# Patient Record
Sex: FEMALE | Race: WHITE | NOT HISPANIC OR LATINO
[De-identification: names, ages, dates, MRNs, and addresses within clinical notes are randomized per-mention and may not be internally consistent; named-entity substitution may affect disease eponyms.]

---

## 2017-01-09 ENCOUNTER — APPOINTMENT (OUTPATIENT)
Dept: OPHTHALMOLOGY | Facility: CLINIC | Age: 77
End: 2017-01-09

## 2017-02-10 ENCOUNTER — APPOINTMENT (OUTPATIENT)
Dept: OPHTHALMOLOGY | Facility: CLINIC | Age: 77
End: 2017-02-10

## 2017-02-10 DIAGNOSIS — H35.373 PUCKERING OF MACULA, BILATERAL: ICD-10-CM

## 2017-02-10 DIAGNOSIS — H35.3130 NONEXUDATIVE AGE-RELATED MACULAR DEGENERATION, BILATERAL, STAGE UNSPECIFIED: ICD-10-CM

## 2017-02-10 DIAGNOSIS — D31.31 BENIGN NEOPLASM OF RIGHT CHOROID: ICD-10-CM

## 2017-02-10 DIAGNOSIS — H43.813 VITREOUS DEGENERATION, BILATERAL: ICD-10-CM

## 2017-06-13 ENCOUNTER — TRANSCRIPTION ENCOUNTER (OUTPATIENT)
Age: 77
End: 2017-06-13

## 2017-08-11 ENCOUNTER — APPOINTMENT (OUTPATIENT)
Dept: OPHTHALMOLOGY | Facility: CLINIC | Age: 77
End: 2017-08-11

## 2017-08-25 ENCOUNTER — EMERGENCY (EMERGENCY)
Facility: HOSPITAL | Age: 77
LOS: 1 days | Discharge: HOME | End: 2017-08-25
Attending: EMERGENCY MEDICINE | Admitting: EMERGENCY MEDICINE
Payer: MEDICARE

## 2017-08-25 VITALS
RESPIRATION RATE: 18 BRPM | WEIGHT: 134.92 LBS | DIASTOLIC BLOOD PRESSURE: 64 MMHG | OXYGEN SATURATION: 98 % | HEART RATE: 74 BPM | SYSTOLIC BLOOD PRESSURE: 114 MMHG | TEMPERATURE: 98 F

## 2017-08-25 DIAGNOSIS — S51.012A LACERATION WITHOUT FOREIGN BODY OF LEFT ELBOW, INITIAL ENCOUNTER: ICD-10-CM

## 2017-08-25 DIAGNOSIS — Z79.899 OTHER LONG TERM (CURRENT) DRUG THERAPY: ICD-10-CM
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
## 2017-08-25 DIAGNOSIS — Z88.0 ALLERGY STATUS TO PENICILLIN: ICD-10-CM
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
## 2017-08-25 PROCEDURE — 13121 CMPLX RPR S/A/L 2.6-7.5 CM: CPT

## 2017-08-25 PROCEDURE — 99285 EMERGENCY DEPT VISIT HI MDM: CPT | Mod: 25

## 2017-08-25 PROCEDURE — 99284 EMERGENCY DEPT VISIT MOD MDM: CPT

## 2017-08-25 RX ORDER — FOLIC ACID/VIT B COMPLEX AND C 400 MCG
1 TABLET ORAL
Qty: 14 | Refills: 0 | OUTPATIENT
Start: 2017-08-25 | End: 2017-09-08

## 2017-08-25 NOTE — CONSULT NOTE ADULT - ASSESSMENT
IMP- complex LT elbow laceration, 5.5 cm    Plan-  complex repair LT elbow laceration, 5.5 cm    RTO 10 days  Vitamin A OD x 30 d  PO Abx

## 2017-08-25 NOTE — CONSULT NOTE ADULT - SUBJECTIVE AND OBJECTIVE BOX
76 year old female who fell on concrete sustaining laceration to LT elbow.  Patient complains of pain, bleeding deformity.  Patient notes some tingling to little finger    PMH- temporal arteritis, cardiac  PSH- tonsils, appy, hysterect  Meds- prednisone, toprol, cymbalta, simvastatin  ALL- PCN=>hives, epi=> palpitations    PE- system specific  5.5 cm full thickness laceration LT lateral elbow  no exposed joint  mild to moderate tenderness  moderate ecchymosis

## 2017-08-25 NOTE — ED PROVIDER NOTE - PHYSICAL EXAMINATION
VITAL SIGNS: I have reviewed nursing notes and confirm.  CONSTITUTIONAL: Well-developed; well-nourished; in no acute distress.  SKIN: Agree with RN documentation regarding decubitus evaluation. Remainder of skin exam is warm and dry, no acute rash.  HEAD: Normocephalic; atraumatic.  EYES: PERRL, EOM intact; conjunctiva and sclera clear.  ENT: No nasal discharge; airway clear.  NECK: Supple; non tender.  CARD: S1, S2 normal; no murmurs, gallops, or rubs. Regular rate and rhythm.  RESP: No wheezes, rales or rhonchi.  ABD: Normal bowel sounds; soft; non-distended; non-tender; no hepatosplenomegaly.  EXT: Normal ROM all ext, + 3cm laceration over L elbow olecranon w/out joint involvement or foreign body, not actively bleeding, all other ext wnl  LYMPH: No acute cervical adenopathy.  NEURO: Alert, oriented. Grossly unremarkable.  PSYCH: Cooperative, appropriate.

## 2017-08-25 NOTE — ED ADULT TRIAGE NOTE - CHIEF COMPLAINT QUOTE
Pt c/o pain and laceration to the L elbow status post fall. "My foot just went to the side." Denies injuries to the head. Ambulates with steady gait. Denies SOB, CP, dizziness.

## 2017-08-25 NOTE — ED PROVIDER NOTE - MEDICAL DECISION MAKING DETAILS
tetanus UTD, wound closed by plastics, sterile dressing applied, sending out with vitamin A to speed wound healing on pt with chronic steroids. Given return precautions and anticipatory guidance.

## 2017-08-25 NOTE — ED PROVIDER NOTE - OBJECTIVE STATEMENT
75 y/o F w/giant cell arteritis on prednisone p/w mechanical fall onto L elbow 20min prior to arrival in which pt stumbled on sidewalk, didn't hit head. No LOC, remembers event. No prodromal CP/SOB/palpitations/visual sx. Last tetanus <5 yrs. States her only injury is a laceration on her L elbow.

## 2018-01-08 ENCOUNTER — APPOINTMENT (OUTPATIENT)
Dept: OPHTHALMOLOGY | Facility: CLINIC | Age: 78
End: 2018-01-08

## 2018-01-12 ENCOUNTER — TRANSCRIPTION ENCOUNTER (OUTPATIENT)
Age: 78
End: 2018-01-12

## 2018-07-25 ENCOUNTER — EMERGENCY (EMERGENCY)
Facility: HOSPITAL | Age: 78
LOS: 1 days | Discharge: HOME | End: 2018-07-25
Admitting: EMERGENCY MEDICINE
Payer: MEDICARE

## 2018-07-25 VITALS
DIASTOLIC BLOOD PRESSURE: 70 MMHG | TEMPERATURE: 98 F | HEART RATE: 82 BPM | SYSTOLIC BLOOD PRESSURE: 113 MMHG | OXYGEN SATURATION: 95 % | RESPIRATION RATE: 18 BRPM | WEIGHT: 136.91 LBS

## 2018-07-25 DIAGNOSIS — S81.011A LACERATION WITHOUT FOREIGN BODY, RIGHT KNEE, INITIAL ENCOUNTER: ICD-10-CM
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
## 2018-07-25 DIAGNOSIS — Z79.899 OTHER LONG TERM (CURRENT) DRUG THERAPY: ICD-10-CM
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
## 2018-07-25 DIAGNOSIS — Z88.8 ALLERGY STATUS TO OTHER DRUGS, MEDICAMENTS AND BIOLOGICAL SUBSTANCES: ICD-10-CM

## 2018-07-25 DIAGNOSIS — S82.001A UNSPECIFIED FRACTURE OF RIGHT PATELLA, INITIAL ENCOUNTER FOR CLOSED FRACTURE: ICD-10-CM

## 2018-07-25 DIAGNOSIS — S81.812A LACERATION WITHOUT FOREIGN BODY, LEFT LOWER LEG, INITIAL ENCOUNTER: ICD-10-CM

## 2018-07-25 DIAGNOSIS — Z88.0 ALLERGY STATUS TO PENICILLIN: ICD-10-CM
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
## 2018-07-25 DIAGNOSIS — I10 ESSENTIAL (PRIMARY) HYPERTENSION: ICD-10-CM

## 2018-07-25 PROCEDURE — 99285 EMERGENCY DEPT VISIT HI MDM: CPT | Mod: 25

## 2018-07-25 PROCEDURE — 73562 X-RAY EXAM OF KNEE 3: CPT | Mod: 26,RT

## 2018-07-25 PROCEDURE — 12002 RPR S/N/AX/GEN/TRNK2.6-7.5CM: CPT | Mod: XU

## 2018-07-25 PROCEDURE — 73562 X-RAY EXAM OF KNEE 3: CPT

## 2018-07-25 PROCEDURE — 99284 EMERGENCY DEPT VISIT MOD MDM: CPT

## 2018-07-25 PROCEDURE — 27520 TREAT KNEECAP FRACTURE: CPT

## 2018-07-25 RX ORDER — CEPHALEXIN 500 MG
1 CAPSULE ORAL
Qty: 21 | Refills: 0 | OUTPATIENT
Start: 2018-07-25 | End: 2018-07-31

## 2018-07-25 RX ORDER — CEPHALEXIN 500 MG
500 CAPSULE ORAL ONCE
Qty: 0 | Refills: 0 | Status: COMPLETED | OUTPATIENT
Start: 2018-07-25 | End: 2018-07-25

## 2018-07-25 RX ORDER — IBUPROFEN 200 MG
400 TABLET ORAL ONCE
Qty: 0 | Refills: 0 | Status: COMPLETED | OUTPATIENT
Start: 2018-07-25 | End: 2018-07-25

## 2018-07-25 RX ADMIN — Medication 400 MILLIGRAM(S): at 17:32

## 2018-07-25 RX ADMIN — Medication 500 MILLIGRAM(S): at 17:32

## 2018-07-25 NOTE — ED PROVIDER NOTE - OBJECTIVE STATEMENT
78 y/o f with h/o cardiomyopathy  , IBS , HTN s/p mechanical fall today in her apartment while packing to go away. She state of tripped fell hurting her knee and has a laceration. Admit to pain. Denies head injury, loc, n, v, sob, chest pain, paresis, paresthesia.

## 2018-07-25 NOTE — CONSULT NOTE ADULT - ATTENDING COMMENTS
I examined the patient, discuss treatment options with patient and staff. Repaired the wound with simple running technique.

## 2018-07-25 NOTE — ED PROVIDER NOTE - PHYSICAL EXAMINATION
Right knee + tenderness over the patella, No pain at proximal fibula  Left lower leg + tenderness over 3cm skin tear, no deformity, no motor or sensory deficit, NVI.

## 2018-07-25 NOTE — CONSULT NOTE ADULT - SUBJECTIVE AND OBJECTIVE BOX
Patient fell, sustained avulsive 6cm laceration with inferiorly based flap of mid anterior left lower leg. Flap ecchymotic.

## 2018-07-25 NOTE — ED PROVIDER NOTE - MEDICAL DECISION MAKING DETAILS
Patient with left lower leg laceration repaired by plastics. Right knee fracture with knee immobilizer and abx therapy for wound. Recommend f/u with Dr. Sweet and ortho. TD is up to date as per pt.

## 2018-07-25 NOTE — ED PROVIDER NOTE - CHPI ED SYMPTOMS NEG
no weakness/no abrasion/no stiffness/no tingling/no numbness/no difficulty bearing weight/no deformity

## 2018-07-30 ENCOUNTER — APPOINTMENT (OUTPATIENT)
Dept: ORTHOPEDIC SURGERY | Facility: CLINIC | Age: 78
End: 2018-07-30
Payer: MEDICARE

## 2018-07-30 VITALS
DIASTOLIC BLOOD PRESSURE: 81 MMHG | HEIGHT: 62 IN | SYSTOLIC BLOOD PRESSURE: 133 MMHG | BODY MASS INDEX: 24.84 KG/M2 | HEART RATE: 69 BPM | WEIGHT: 135 LBS

## 2018-07-30 DIAGNOSIS — Z78.9 OTHER SPECIFIED HEALTH STATUS: ICD-10-CM

## 2018-07-30 DIAGNOSIS — Z86.79 PERSONAL HISTORY OF OTHER DISEASES OF THE CIRCULATORY SYSTEM: ICD-10-CM

## 2018-07-30 PROCEDURE — 73560 X-RAY EXAM OF KNEE 1 OR 2: CPT | Mod: RT

## 2018-07-30 PROCEDURE — 99203 OFFICE O/P NEW LOW 30 MIN: CPT

## 2018-07-30 RX ORDER — ESCITALOPRAM OXALATE 5 MG/1
5 TABLET, FILM COATED ORAL
Refills: 0 | Status: ACTIVE | COMMUNITY

## 2018-07-30 RX ORDER — CARVEDILOL 12.5 MG/1
12.5 TABLET, FILM COATED ORAL
Refills: 0 | Status: ACTIVE | COMMUNITY

## 2018-08-06 ENCOUNTER — APPOINTMENT (OUTPATIENT)
Dept: ORTHOPEDIC SURGERY | Facility: CLINIC | Age: 78
End: 2018-08-06
Payer: MEDICARE

## 2018-08-06 DIAGNOSIS — M25.569 PAIN IN UNSPECIFIED KNEE: ICD-10-CM

## 2018-08-06 PROCEDURE — 99213 OFFICE O/P EST LOW 20 MIN: CPT

## 2018-08-13 ENCOUNTER — APPOINTMENT (OUTPATIENT)
Dept: ORTHOPEDIC SURGERY | Facility: CLINIC | Age: 78
End: 2018-08-13
Payer: MEDICARE

## 2018-08-13 VITALS
DIASTOLIC BLOOD PRESSURE: 71 MMHG | HEIGHT: 62 IN | WEIGHT: 135 LBS | BODY MASS INDEX: 24.84 KG/M2 | HEART RATE: 65 BPM | SYSTOLIC BLOOD PRESSURE: 116 MMHG

## 2018-08-13 PROCEDURE — 73560 X-RAY EXAM OF KNEE 1 OR 2: CPT | Mod: RT

## 2018-08-13 PROCEDURE — 99213 OFFICE O/P EST LOW 20 MIN: CPT

## 2018-09-05 ENCOUNTER — APPOINTMENT (OUTPATIENT)
Dept: ORTHOPEDIC SURGERY | Facility: CLINIC | Age: 78
End: 2018-09-05
Payer: MEDICARE

## 2018-09-05 PROCEDURE — 73560 X-RAY EXAM OF KNEE 1 OR 2: CPT | Mod: RT

## 2018-09-05 PROCEDURE — 99213 OFFICE O/P EST LOW 20 MIN: CPT

## 2018-09-17 ENCOUNTER — APPOINTMENT (OUTPATIENT)
Dept: ORTHOPEDIC SURGERY | Facility: CLINIC | Age: 78
End: 2018-09-17
Payer: MEDICARE

## 2018-09-17 PROCEDURE — 73564 X-RAY EXAM KNEE 4 OR MORE: CPT | Mod: RT

## 2018-09-17 PROCEDURE — 73560 X-RAY EXAM OF KNEE 1 OR 2: CPT | Mod: LT

## 2018-09-17 PROCEDURE — 99213 OFFICE O/P EST LOW 20 MIN: CPT

## 2018-10-01 ENCOUNTER — APPOINTMENT (OUTPATIENT)
Dept: ORTHOPEDIC SURGERY | Facility: CLINIC | Age: 78
End: 2018-10-01
Payer: MEDICARE

## 2018-10-01 VITALS
BODY MASS INDEX: 24.84 KG/M2 | HEIGHT: 62 IN | HEART RATE: 54 BPM | WEIGHT: 135 LBS | DIASTOLIC BLOOD PRESSURE: 72 MMHG | SYSTOLIC BLOOD PRESSURE: 137 MMHG

## 2018-10-01 PROCEDURE — 99213 OFFICE O/P EST LOW 20 MIN: CPT

## 2018-10-01 PROCEDURE — 73560 X-RAY EXAM OF KNEE 1 OR 2: CPT | Mod: RT

## 2018-10-22 ENCOUNTER — APPOINTMENT (OUTPATIENT)
Dept: ORTHOPEDIC SURGERY | Facility: CLINIC | Age: 78
End: 2018-10-22
Payer: MEDICARE

## 2018-10-22 VITALS — HEIGHT: 62 IN | OXYGEN SATURATION: 93 % | BODY MASS INDEX: 24.84 KG/M2 | HEART RATE: 63 BPM | WEIGHT: 135 LBS

## 2018-10-22 DIAGNOSIS — M25.561 PAIN IN RIGHT KNEE: ICD-10-CM

## 2018-10-22 PROCEDURE — 99213 OFFICE O/P EST LOW 20 MIN: CPT

## 2018-10-22 PROCEDURE — 73560 X-RAY EXAM OF KNEE 1 OR 2: CPT | Mod: RT

## 2018-12-10 ENCOUNTER — APPOINTMENT (OUTPATIENT)
Dept: ORTHOPEDIC SURGERY | Facility: CLINIC | Age: 78
End: 2018-12-10
Payer: MEDICARE

## 2018-12-10 VITALS
DIASTOLIC BLOOD PRESSURE: 63 MMHG | WEIGHT: 135 LBS | HEIGHT: 62 IN | BODY MASS INDEX: 24.84 KG/M2 | HEART RATE: 83 BPM | SYSTOLIC BLOOD PRESSURE: 90 MMHG

## 2018-12-10 DIAGNOSIS — S82.009A UNSPECIFIED FRACTURE OF UNSPECIFIED PATELLA, INITIAL ENCOUNTER FOR CLOSED FRACTURE: ICD-10-CM

## 2018-12-10 DIAGNOSIS — M48.061 SPINAL STENOSIS, LUMBAR REGION WITHOUT NEUROGENIC CLAUDICATION: ICD-10-CM

## 2018-12-10 PROCEDURE — 99213 OFFICE O/P EST LOW 20 MIN: CPT

## 2018-12-10 PROCEDURE — 73564 X-RAY EXAM KNEE 4 OR MORE: CPT | Mod: RT

## 2018-12-10 PROCEDURE — 73560 X-RAY EXAM OF KNEE 1 OR 2: CPT | Mod: LT

## 2019-07-22 ENCOUNTER — APPOINTMENT (OUTPATIENT)
Dept: ORTHOPEDIC SURGERY | Facility: CLINIC | Age: 79
End: 2019-07-22
Payer: MEDICARE

## 2019-07-22 DIAGNOSIS — M25.562 PAIN IN LEFT KNEE: ICD-10-CM

## 2019-07-22 DIAGNOSIS — S80.02XA CONTUSION OF LEFT KNEE, INITIAL ENCOUNTER: ICD-10-CM

## 2019-07-22 PROCEDURE — 73564 X-RAY EXAM KNEE 4 OR MORE: CPT | Mod: LT

## 2019-07-22 PROCEDURE — 73560 X-RAY EXAM OF KNEE 1 OR 2: CPT | Mod: RT

## 2019-07-22 PROCEDURE — 99213 OFFICE O/P EST LOW 20 MIN: CPT

## 2019-07-22 NOTE — ADDENDUM
[FreeTextEntry1] : This note was written by Alessandra Islas on 07/22/2019 acting as scribe for Dr. Allan Mcmullen M.D.\par \par I, Dr. Allan Mcmullen M.D., have read and attest that all the information, medical decision making and discharge instructions within are true and accurate.\par

## 2019-07-22 NOTE — DISCUSSION/SUMMARY
[de-identified] : Discussed at length the nature of the patients condition. Their left knee symptoms appear secondary to a resolving contusion following her fall as described above. I reviewed x-ray films with them and reassured her there are no fractures or loose bodies. I recommended ice, Tylenol, and activities as tolerated. She may follow up in 6-8 weeks if still symptomatic.

## 2019-07-22 NOTE — PHYSICAL EXAM
[de-identified] : Left knee xrays, standing AP/Lateral, Merchant, and 45 degree PA standing view, taken at the office today shows normal alignment, good joint space maintained, patella sits at an appropriate height in a central position, no fractures or loose bodies. \par \par Right knee xrays, standing AP/Lateral, Merchant, and 45 degree PA standing view, taken at the office today shows normal alignment, good joint space maintained, patella sits at an appropriate height in a central position  [de-identified] : Left Knee\par Inspection: mild soft tissue swelling over pes bursa and proximal medial tibia \par Wounds: none.\par Alignment: normal.\par Palpation: tender over pes bursa and proximal medial tibia on palpation.\par ROM: Active (in degrees) 0-135 with mild patellofemoral clicking \par Ligamentous laxity (neg): all ligaments appear stable, negative ant. drawer test, negative post. drawer test, stable to varus stress test, stable to valgus stress test, negative Lachman's test, negative pivot shift test,\par Meniscal Test: negative McMurrays, negative Iwona.\par Patellofemoral Alignment Test: Q angle-, normal.\par Muscle Test: good quad strength.\par Leg examination: calf is soft and non-tender.\par \par Right knee\par Inspection: no effusion or erythema.\par Wounds: none.\par Alignment: normal.\par Palpation: no specific tenderness on palpation.\par ROM: Active (in degrees) 0-135\par Ligamentous laxity (neg): all ligaments appear stable, negative ant. drawer test, negative post. drawer test, stable to varus stress test, stable to valgus stress test, negative Lachman's test, negative pivot shift test,\par Meniscal Test: negative McMurrays, negative Iwona.\par Patellofemoral Alignment Test: Q angle-, normal.\par Muscle Test: good quad strength.\par Leg examination: calf is soft and non-tender.\par

## 2019-07-22 NOTE — HISTORY OF PRESENT ILLNESS
[de-identified] : 78 year old female presents for follow up evaluation with a new problem of left knee pain for the past 2 weeks. She states on 7/8/2019 she fell on a wet marble floor onto her knee and head. She has been self treating with ice and Tylenol. Her pain is anterior and sharp in nature, worsened with activities and transfers. She denies swelling or mechanical symptoms. She states her walking distance varies on the day and she takes the stairs one at a time using the handrail.. Hrr PCP recommended rest and to follow up with her orthopedist in the case of persistent pain. Today, she would like to discuss her symptoms with Dr. Mcmullen.

## 2021-01-12 NOTE — ED PROVIDER NOTE - LOCATION
Vaginal Yeast Infection, Adult    Vaginal yeast infection is a condition that causes vaginal discharge as well as soreness, swelling, and redness (inflammation) of the vagina. This is a common condition. Some women get this infection frequently.  What are the causes?  This condition is caused by a change in the normal balance of the yeast (candida) and bacteria that live in the vagina. This change causes an overgrowth of yeast, which causes the inflammation.  What increases the risk?  The condition is more likely to develop in women who:  · Take antibiotic medicines.  · Have diabetes.  · Take birth control pills.  · Are pregnant.  · Douche often.  · Have a weak body defense system (immune system).  · Have been taking steroid medicines for a long time.  · Frequently wear tight clothing.  What are the signs or symptoms?  Symptoms of this condition include:  · White, thick, creamy vaginal discharge.  · Swelling, itching, redness, and irritation of the vagina. The lips of the vagina (vulva) may be affected as well.  · Pain or a burning feeling while urinating.  · Pain during sex.  How is this diagnosed?  This condition is diagnosed based on:  · Your medical history.  · A physical exam.  · A pelvic exam. Your health care provider will examine a sample of your vaginal discharge under a microscope. Your health care provider may send this sample for testing to confirm the diagnosis.  How is this treated?  This condition is treated with medicine. Medicines may be over-the-counter or prescription. You may be told to use one or more of the following:  · Medicine that is taken by mouth (orally).  · Medicine that is applied as a cream (topically).  · Medicine that is inserted directly into the vagina (suppository).  Follow these instructions at home:    Lifestyle  · Do not have sex until your health care provider approves. Tell your sex partner that you have a yeast infection. That person should go to his or her health care  provider and ask if they should also be treated.  · Do not wear tight clothes, such as pantyhose or tight pants.  · Wear breathable cotton underwear.  General instructions  · Take or apply over-the-counter and prescription medicines only as told by your health care provider.  · Eat more yogurt. This may help to keep your yeast infection from returning.  · Do not use tampons until your health care provider approves.  · Try taking a sitz bath to help with discomfort. This is a warm water bath that is taken while you are sitting down. The water should only come up to your hips and should cover your buttocks. Do this 3-4 times per day or as told by your health care provider.  · Do not douche.  · If you have diabetes, keep your blood sugar levels under control.  · Keep all follow-up visits as told by your health care provider. This is important.  Contact a health care provider if:  · You have a fever.  · Your symptoms go away and then return.  · Your symptoms do not get better with treatment.  · Your symptoms get worse.  · You have new symptoms.  · You develop blisters in or around your vagina.  · You have blood coming from your vagina and it is not your menstrual period.  · You develop pain in your abdomen.  Summary  · Vaginal yeast infection is a condition that causes discharge as well as soreness, swelling, and redness (inflammation) of the vagina.  · This condition is treated with medicine. Medicines may be over-the-counter or prescription.  · Take or apply over-the-counter and prescription medicines only as told by your health care provider.  · Do not douche. Do not have sex or use tampons until your health care provider approves.  · Contact a health care provider if your symptoms do not get better with treatment or your symptoms go away and then return.  This information is not intended to replace advice given to you by your health care provider. Make sure you discuss any questions you have with your health care  provider.  Document Revised: 07/17/2020 Document Reviewed: 05/06/2019  Elsevier Patient Education © 2020 [employer]     knee

## 2021-10-19 ENCOUNTER — EMERGENCY (EMERGENCY)
Facility: HOSPITAL | Age: 81
LOS: 1 days | Discharge: HOME | End: 2021-10-19
Attending: EMERGENCY MEDICINE | Admitting: EMERGENCY MEDICINE
Payer: MEDICARE

## 2021-10-19 VITALS
HEART RATE: 90 BPM | DIASTOLIC BLOOD PRESSURE: 65 MMHG | SYSTOLIC BLOOD PRESSURE: 102 MMHG | OXYGEN SATURATION: 95 % | RESPIRATION RATE: 18 BRPM | WEIGHT: 126.99 LBS | TEMPERATURE: 98 F | HEIGHT: 62 IN

## 2021-10-19 DIAGNOSIS — K58.9 IRRITABLE BOWEL SYNDROME WITHOUT DIARRHEA: ICD-10-CM

## 2021-10-19 DIAGNOSIS — M25.562 PAIN IN LEFT KNEE: ICD-10-CM
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
## 2021-10-19 DIAGNOSIS — S89.92XA UNSPECIFIED INJURY OF LEFT LOWER LEG, INITIAL ENCOUNTER: ICD-10-CM
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
## 2021-10-19 DIAGNOSIS — Z88.8 ALLERGY STATUS TO OTHER DRUGS, MEDICAMENTS AND BIOLOGICAL SUBSTANCES STATUS: ICD-10-CM

## 2021-10-19 DIAGNOSIS — Z88.0 ALLERGY STATUS TO PENICILLIN: ICD-10-CM

## 2021-10-19 DIAGNOSIS — I42.9 CARDIOMYOPATHY, UNSPECIFIED: ICD-10-CM

## 2021-10-19 DIAGNOSIS — I10 ESSENTIAL (PRIMARY) HYPERTENSION: ICD-10-CM

## 2021-10-19 DIAGNOSIS — F32.9 MAJOR DEPRESSIVE DISORDER, SINGLE EPISODE, UNSPECIFIED: ICD-10-CM

## 2021-10-19 PROCEDURE — 73564 X-RAY EXAM KNEE 4 OR MORE: CPT

## 2021-10-19 PROCEDURE — 99283 EMERGENCY DEPT VISIT LOW MDM: CPT | Mod: 25

## 2021-10-19 PROCEDURE — 99284 EMERGENCY DEPT VISIT MOD MDM: CPT

## 2021-10-19 PROCEDURE — 73564 X-RAY EXAM KNEE 4 OR MORE: CPT | Mod: 26,LT

## 2021-10-19 RX ORDER — ACETAMINOPHEN 500 MG
650 TABLET ORAL ONCE
Refills: 0 | Status: COMPLETED | OUTPATIENT
Start: 2021-10-19 | End: 2021-10-19

## 2021-10-19 RX ADMIN — Medication 650 MILLIGRAM(S): at 19:48

## 2021-10-19 NOTE — ED ADULT TRIAGE NOTE - CHIEF COMPLAINT QUOTE
Pt presents s/p fall this am 9:30, struck left knee. Pt ambulated 2 blocks home after incident with the help of a bystander. Pt denies head strike, denies LOC.

## 2021-10-19 NOTE — ED PROVIDER NOTE - NS ED ROS FT
Other than symptoms associated with present events the following is reported:  General:  No fever, no chills, no weight loss.  HEENT:  No sore throat.  Respiratory: No cough, no dyspnea, no wheeze.  Cardiovascular:  No chest pain, no palpitations, no orthopnea.  GI: No abdominal pain, no nausea/vomiting, no diarrhea.  : No dysuria, no frequency, no urgency.  Musculoskeletal:  + left knee pain  Neurological:  No headache, no focal weakness.   Psychiatric: No emotional stress, no depression.  Derm:  No rash.

## 2021-10-19 NOTE — ED PROVIDER NOTE - NSFOLLOWUPINSTRUCTIONS_ED_ALL_ED_FT
Patellar Fracture    WHAT YOU NEED TO KNOW:    A patellar fracture is a break in your kneecap.    Knee Anatomy          DISCHARGE INSTRUCTIONS:    Call your local emergency number (911 in the [place]) if:   •You suddenly feel lightheaded and short of breath.      •You have chest pain when you take a deep breath or cough.      •You cough up blood.      Return to the emergency department if:   •Your cast or splint breaks or gets damaged.      •Your foot or toes are swollen, cold, numb, or they turn white or blue.      •Your leg feels warm, tender, and painful. It may look swollen and red.      Call your doctor or bone specialist if:   •You have a fever.      •Your pain gets worse, even after treatment.      •You have questions or concerns about your condition or care.      Medicines: You may need any of the following:   •Prescription pain medicine may be given. Ask your healthcare provider how to take this medicine safely. Some prescription pain medicines contain acetaminophen. Do not take other medicines that contain acetaminophen without talking to your healthcare provider. Too much acetaminophen may cause liver damage. Prescription pain medicine may cause constipation. Ask your healthcare provider how to prevent or treat constipation.       •Antibiotics help fight or treat a bacterial infection.      •Take your medicine as directed. Contact your healthcare provider if you think your medicine is not helping or if you have side effects. Tell him or her if you are allergic to any medicine. Keep a list of the medicines, vitamins, and herbs you take. Include the amounts, and when and why you take them. Bring the list or the pill bottles to follow-up visits. Carry your medicine list with you in case of an emergency.      Brace, cast, or splint care:   •Check the skin around the device every day. Apply lotion to any red or sore areas.      •Ask your healthcare provider when you can bathe. Do not get the device wet. Cover it with 2 plastic bags. Tape the bags above the device to prevent water from getting in. Keep your knee out of the water as much as possible.      •Do not push or lean on any part of the cast or splint.      •Do not put any sharp or pointed objects inside the cast.      Self-care:   •Rest your knee as directed. Crutches help rest and support your knee when you walk. Your healthcare provider will tell you when you can start to use crutches. Follow instructions about how much weight to put on your leg.  Walking with Crutches           •Apply ice to help decrease swelling and pain. Use an ice pack, or put crushed ice in a plastic bag. Cover it with a towel before you place it on your knee or supportive device. Apply ice for 15 to 20 minutes every hour for 2 days, or as directed.  Ice and Elevation           •Elevate your knee above the level of your heart as often as you can. This will help decrease swelling and pain. prop your leg on pillows or blankets to keep it elevated comfortably. Do not put pillows directly under your knee.      Go to physical therapy, if recommended. A physical therapist teaches you exercises to help improve movement and strength, and to decrease pain.    Follow up with your doctor or bone specialist as directed: You may need to return to have your stitches removed. Write down your questions so you remember to ask them during your visits.       © Copyright [employer] 2021

## 2021-10-19 NOTE — ED PROVIDER NOTE - WR ORDER STATUS 1
Spoke to pt. Informed him he is due for an appointment to receive stress test and lab orders. Schedule an appointment with Dr. Barnett pt. Verbally accepted appt.    Performed Resulted

## 2021-10-19 NOTE — ED PROVIDER NOTE - CHPI ED SYMPTOMS NEG
no headache, no neck pain, no chest pain, no SOB, no chills, no diarrhea, no blurry vision, no dizziness/no fever/no loss of consciousness/no numbness/no tingling/no vomiting

## 2021-10-19 NOTE — ED PROVIDER NOTE - CLINICAL SUMMARY MEDICAL DECISION MAKING FREE TEXT BOX
82 y/o F presents to ED for mechanical fall presenting with left knee pain. Had an assured decision making conversation about the risks and benefits of CT head and CT C spine, although can clear Pt by the [place] Head CT rules. Pt agrees that she does not want CT at this time and would like an xray of the left knee. Given Tylenol for pain control. Pending xray read.

## 2021-10-19 NOTE — ED PROVIDER NOTE - PATIENT PORTAL LINK FT
You can access the FollowMyHealth Patient Portal offered by [facility] by registering at the following website: [email or web address]. By joining [employer]’s FollowMyHealth portal, you will also be able to view your health information using other applications (apps) compatible with our system.

## 2021-10-19 NOTE — ED PROVIDER NOTE - OBJECTIVE STATEMENT
80 y/o F, fully vaccinated for Covd. PMHx IBS, HTN, cardiomyopathy (on Metoprolol), depression (Citalopram), SHx hip replacement. States that she was walking on [address] and tripped on uneven sidewalk, falling and landing on her left knee. Pt bumped her upper lip onto the ground as well. Denies LOC. She was able to get up on her own and go home, ambulating on her own. While at home, she took a nap and states worsening pain on her left knee so she started using and old cane and came to the ED for evaluation. Denies headache, neck pain, chest pain, SOB, fevers, chills, vomiting, diarrhea, blurry vision, dizziness, numbness, tingling. Not on blood thinners.

## 2021-10-19 NOTE — ED PROVIDER NOTE - PHYSICAL EXAMINATION
CONSTITUTIONAL: Well-appearing; in no apparent distress.   	HEAD: Normocephalic; atraumatic.   	EYES:  conjunctiva and sclera clear  	ENT: TM normal. No intraoral or dental trauma. normal nose; no rhinorrhea; normal pharynx with no erythema or lesions. Small hematoma on the right upper lip. NO laceration or abrasions, no carotid bruits.   	NECK: Supple; non-tender;   	CARDIOVASCULAR: Normal S1, S2; no murmurs, rubs, or gallops. Regular rate and rhythm. No chest wall tenderness.   	RESPIRATORY: Breathing easily; breath sounds clear and equal bilaterally; no wheezes, rhonchi, or rales.  	GI: Soft; non-distended; non-tender; no palpable organomegaly.   	MSK: No C/T/L spine tenderness. Full ROM of all 4 extremities except in the left LLE. Tenderness over the anterior patella. Otherwise all 4 extremities NVI.   	SKIN: Normal for age and race; warm; dry; good turgor; no apparent lesions or rash.   	NEURO: A & O x 3; face symmetric; grossly unremarkable.   PSYCHOLOGICAL: The patient’s mood and manner are appropriate.

## 2021-10-19 NOTE — ED PROVIDER NOTE - DURATION
REA  Spoke with patient regarding the anticoagulation monitoring with the Coronavirus situation. He reports that just a couple of months ago, he looked into Eliquis and Xarelto and reports that they are too expensive and cannot afford.    today

## 2021-10-19 NOTE — ED PROVIDER NOTE - NSFOLLOWUPCLINICS_GEN_ALL_ED_FT
[facility]  .  [place]   Phone: [phone]  Fax:   Follow Up Time: 4-6 Days

## 2021-10-19 NOTE — ED ADULT NURSE NOTE - OBJECTIVE STATEMENT
patient present with leg pain s/p fall in the street. denies dizziness, nausea or vomiting. alert and oriented x4. will continue to monitor.

## 2021-10-19 NOTE — ED PROVIDER NOTE - NSICDXPASTMEDICALHX_GEN_ALL_CORE_FT
PAST MEDICAL HISTORY:  Cardiomyopathy     Depression     IBS (irritable bowel syndrome)       HTN (hypertension)

## 2021-10-19 NOTE — ED PROVIDER NOTE - PROGRESS NOTE DETAILS
patient ambulatory to bathroom with cane, xr concerning for nondisplaced patellar fx. case discussed with rads resident who agrees. will place in immobilizer and advise RICE with ortho follow up and return to ED if symptoms worsen or unable to complete ADLs. motrin/tylenol for pain prn

## 2021-10-20 DIAGNOSIS — Z96.649 PRESENCE OF UNSPECIFIED ARTIFICIAL HIP JOINT: Chronic | ICD-10-CM

## 2022-07-26 ENCOUNTER — EMERGENCY (EMERGENCY)
Facility: HOSPITAL | Age: 82
LOS: 1 days | Discharge: HOME | End: 2022-07-26
Admitting: STUDENT IN AN ORGANIZED HEALTH CARE EDUCATION/TRAINING PROGRAM
Payer: MEDICARE

## 2022-07-26 VITALS
SYSTOLIC BLOOD PRESSURE: 111 MMHG | TEMPERATURE: 98 F | OXYGEN SATURATION: 95 % | HEIGHT: 62 IN | WEIGHT: 119.93 LBS | DIASTOLIC BLOOD PRESSURE: 67 MMHG | HEART RATE: 70 BPM | RESPIRATION RATE: 18 BRPM

## 2022-07-26 DIAGNOSIS — Z96.649 PRESENCE OF UNSPECIFIED ARTIFICIAL HIP JOINT: Chronic | ICD-10-CM
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
## 2022-07-26 DIAGNOSIS — S81.812A LACERATION WITHOUT FOREIGN BODY, LEFT LOWER LEG, INITIAL ENCOUNTER: ICD-10-CM

## 2022-07-26 DIAGNOSIS — Z88.8 ALLERGY STATUS TO OTHER DRUGS, MEDICAMENTS AND BIOLOGICAL SUBSTANCES STATUS: ICD-10-CM

## 2022-07-26 DIAGNOSIS — Z88.0 ALLERGY STATUS TO PENICILLIN: ICD-10-CM
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
## 2022-07-26 PROCEDURE — 99283 EMERGENCY DEPT VISIT LOW MDM: CPT | Mod: 25

## 2022-07-26 PROCEDURE — 12002 RPR S/N/AX/GEN/TRNK2.6-7.5CM: CPT

## 2022-07-26 PROCEDURE — 99283 EMERGENCY DEPT VISIT LOW MDM: CPT

## 2022-07-26 RX ORDER — CEPHALEXIN 500 MG
1 CAPSULE ORAL
Qty: 15 | Refills: 0
Start: 2022-07-26 | End: 2022-07-30

## 2022-07-26 RX ORDER — CEPHALEXIN 500 MG
500 CAPSULE ORAL ONCE
Refills: 0 | Status: COMPLETED | OUTPATIENT
Start: 2022-07-26 | End: 2022-07-26

## 2022-07-26 RX ADMIN — Medication 500 MILLIGRAM(S): at 20:03

## 2022-07-26 NOTE — ED PROVIDER NOTE - MUSCULOSKELETAL, MLM
5 cm L shaped laceration to lateral left lower leg, sensation intact distally to lower ext, DP/PT 2+

## 2022-07-26 NOTE — ED PROVIDER NOTE - OBJECTIVE STATEMENT
80 y/o f presents with skin tear/laceration to left lower leg after she bumped it on a chair today.  Pt reports her tetanus is up to date.  Denies numbness/tingling to ext, all other ROS negative.

## 2022-07-26 NOTE — ED PROVIDER NOTE - PATIENT PORTAL LINK FT
You can access the FollowMyHealth Patient Portal offered by [facility] by registering at the following website: [email or web address]. By joining [employer]’s FollowMyHealth portal, you will also be able to view your health information using other applications (apps) compatible with our system.

## 2022-07-26 NOTE — ED ADULT TRIAGE NOTE - MEANS OF ARRIVAL
Anemia stable but microcytic indices are worse.  Labs done 7-, read by me, reviewed with pt.  CBC showed HGB was 14.8 up from 14.4, Hematocrit was 47.4 up from 47.1.  MCHC was 31.2 up from 30.6.  B12 injection given today.   ambulatory

## 2022-07-26 NOTE — ED PROCEDURE NOTE - CPROC ED LACER REPAIR DETAIL1
edges of wound reinforced with dermabond and wound sutured/The wound was explored to base in bloodless field./No foreign body

## 2022-07-26 NOTE — ED PROVIDER NOTE - CLINICAL SUMMARY MEDICAL DECISION MAKING FREE TEXT BOX
82 y/o f presents with laceration/skin tear to left lower leg.  Ext NVI, tetanus up to date, lac repaired, will dc with wound care instructions, f/u in 14 days for suture removal, given rx keflex for wound prophylaxis

## 2022-07-26 NOTE — ED ADULT NURSE NOTE - NSFALLRSKASSESSDT_ED_ALL_ED
PRINCIPAL DISCHARGE DIAGNOSIS  Diagnosis: Hemoperitoneum  Assessment and Plan of Treatment: Return to your regular way of eating.  Resume normal activity as tolerated, but no heavy lifting or strenuous activity for 2 weeks.  No driving for next 2 weeks and/or while on narcotic pain medication.  Complete vaginal rest, no tampons, no douching, no tub bathing, no sexual activities for 2 weeks unless otherwise instructed by your doctor.  Call your doctor with any signs and symptoms of infection such as fever, chills, nausea or vomiting.  Call your doctor with redness or swelling at the incision site, fluid leakage or wound separation.  Call your doctor if you're unable to tolerate food or have difficulty urinating.  Call your doctor if you have pain that is not relieved by your prescribed medications.  Notify your doctor with any other concerns.  Follow up with Dr. Woo in 2 weeks.      SECONDARY DISCHARGE DIAGNOSES  Diagnosis: Hemorrhagic shock  Assessment and Plan of Treatment: PRINCIPAL DISCHARGE DIAGNOSIS  Diagnosis: Hemoperitoneum  Assessment and Plan of Treatment: Return to your regular way of eating.  Resume normal activity as tolerated, but no heavy lifting or strenuous activity for 2 weeks.  No driving for next 2 weeks and/or while on narcotic pain medication.  Complete vaginal rest, no tampons, no douching, no tub bathing, no sexual activities for 2 weeks unless otherwise instructed by your doctor.  Call your doctor with any signs and symptoms of infection such as fever, chills, nausea or vomiting.  Call your doctor with redness or swelling at the incision site, fluid leakage or wound separation.  Call your doctor if you're unable to tolerate food or have difficulty urinating.  Call your doctor if you have pain that is not relieved by your prescribed medications.  Notify your doctor with any other concerns.  Follow up in [facility] office in 2 weeks.      SECONDARY DISCHARGE DIAGNOSES  Diagnosis: Hemorrhagic shock  Assessment and Plan of Treatment: 26-Jul-2022 19:47

## 2022-07-26 NOTE — ED ADULT TRIAGE NOTE - OTHER COMPLAINTS
patient presents with laceration to L leg, cut on a chair-- denies blood thinner use--- unknown tetanus status

## 2022-07-27 PROBLEM — I10 ESSENTIAL (PRIMARY) HYPERTENSION: Chronic | Status: ACTIVE | Noted: 2021-10-20

## 2023-03-20 ENCOUNTER — RESULT REVIEW (OUTPATIENT)
Age: 83
End: 2023-03-20

## 2023-03-20 ENCOUNTER — APPOINTMENT (OUTPATIENT)
Dept: ORTHOPEDIC SURGERY | Facility: CLINIC | Age: 83
End: 2023-03-20
Payer: MEDICARE

## 2023-03-20 ENCOUNTER — OUTPATIENT (OUTPATIENT)
Dept: OUTPATIENT SERVICES | Facility: HOSPITAL | Age: 83
LOS: 1 days | End: 2023-03-20
Payer: MEDICARE

## 2023-03-20 VITALS
DIASTOLIC BLOOD PRESSURE: 67 MMHG | SYSTOLIC BLOOD PRESSURE: 124 MMHG | BODY MASS INDEX: 24.84 KG/M2 | WEIGHT: 135 LBS | OXYGEN SATURATION: 97 % | HEART RATE: 60 BPM | HEIGHT: 62 IN

## 2023-03-20 DIAGNOSIS — Z56.0 UNEMPLOYMENT, UNSPECIFIED: ICD-10-CM

## 2023-03-20 DIAGNOSIS — Z96.649 PRESENCE OF UNSPECIFIED ARTIFICIAL HIP JOINT: Chronic | ICD-10-CM

## 2023-03-20 DIAGNOSIS — Z96.642 PRESENCE OF LEFT ARTIFICIAL HIP JOINT: ICD-10-CM

## 2023-03-20 PROCEDURE — 73521 X-RAY EXAM HIPS BI 2 VIEWS: CPT

## 2023-03-20 PROCEDURE — 73521 X-RAY EXAM HIPS BI 2 VIEWS: CPT | Mod: 26

## 2023-03-20 PROCEDURE — 99213 OFFICE O/P EST LOW 20 MIN: CPT

## 2023-03-20 PROCEDURE — 72100 X-RAY EXAM L-S SPINE 2/3 VWS: CPT | Mod: 26

## 2023-03-20 PROCEDURE — 99203 OFFICE O/P NEW LOW 30 MIN: CPT

## 2023-03-20 PROCEDURE — 72100 X-RAY EXAM L-S SPINE 2/3 VWS: CPT

## 2023-03-20 SDOH — ECONOMIC STABILITY - INCOME SECURITY: UNEMPLOYMENT, UNSPECIFIED: Z56.0

## 2023-03-20 NOTE — REVIEW OF SYSTEMS
[Negative] : Heme/Lymph [Arthralgia] : no arthralgia [Joint Pain] : no joint pain [Joint Stiffness] : no joint stiffness [Joint Swelling] : no joint swelling [Fever] : no fever [Chills] : no chills [FreeTextEntry9] : see HPI

## 2023-03-20 NOTE — HISTORY OF PRESENT ILLNESS
[de-identified] : GENET DANIELSON is known to me s/p left hip replacement around 1.5 years ago with me at [facility]. Since we last saw them she has been doing well from a left hip replacement standpoint although she continues to have left and right lower back pain and intermittent right hip pain. She walks a lot and doesn't have any issues doing this. She's been able to resume her normal activities after left hip replacement. Denies any fevers and chills or other signs of infection.  She is concerned because her friends think she is developing dementia.  She had a work-up for this last year but is going to be speaking to her psychiatrist about this later today.\par \par

## 2023-03-20 NOTE — PHYSICAL EXAM
[de-identified] : General: Patient is a well-appearing female in no apparent distress. She is alert and oriented x 3. Vital signs are within normal limits.  No sign of fevers or infectious symptoms.  \par Hygiene: Excellent\par HEENT: Atraumatic with no asymmetry.  Neck motion is normal. No overt hearing deficits.\par Pulmonary: Breathing comfortably.\par Gastrointestinal: Is not obese.  \par Psych: Responding appropriately with appropriate affect. Patient does not demonstrate tangential thought, perseveration or anxiety.\par Vascular: No rashes or obvious skin abnormalities in either lower extremity. Capillary refill is <2sec. Good distal perfusion.\par Neurovascular:\par Varicose veins absent. 5/5 strength with hip flexion, knee extension, ankle dorsiflexion, ankle plantar flexion, and EHL. Sensation is intact over the lower extremity in L2-S1 nerve distributions. 2+ dorsalis pedis and posterior tibial pulses \par \par  [de-identified] : Gait: She walks with a normal gait with no antalgia Trendelenburg\par \par Inspection:\par Hip appears with a well healed surgical scar\par No lymphedema observed.\par No pitting edema observed.\par No ulcerations observed\par \par Palpation:\par No tenderness to palpation\par \par Range of Motion:		\par Right: HF: 105 IR:10 ER: 30			\par Left: HF: 105 IR:10 ER:30\par \par Both hips are stable no sign of dislocation or subluxation on exam\par \par Knee exam is normal bilaterally. No effusions. Good pain free full ROM bilaterally, both knees are stable to varus/valgus and ant/post stress\par \par  [de-identified] : 4 views of the left hip as well as an AP and lateral of the lumbar spine are noted.  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  Right hip joint is well-maintained.  There is a degenerative scoliosis with degenerative changes noted in the lumbar spine.

## 2023-03-20 NOTE — REASON FOR VISIT
[Initial Visit] : an initial visit for [FreeTextEntry2] : s/p left hip replacement, right hip pain, lower back pain

## 2023-03-20 NOTE — DISCUSSION/SUMMARY
[de-identified] : S/P left total hip replacement, progressing well.  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  Symptomatic and pain relief, range of motion, activity advancement and precaution strategies reviewed, including use of over-the-counter medications as needed.  We provided a physical therapy prescription to work on paraspinal muscle strengthening and general conditioning and strengthening.  She declined a referral for evaluation of the spine.  We will follow up as scheduled at least yearly for the hip, but advised them to return sooner if they develops any concerns for an evaluation.

## 2023-03-28 DIAGNOSIS — M51.86 OTHER INTERVERTEBRAL DISC DISORDERS, LUMBAR REGION: ICD-10-CM

## 2023-03-28 DIAGNOSIS — M25.552 PAIN IN LEFT HIP: ICD-10-CM

## 2023-03-28 DIAGNOSIS — M47.816 SPONDYLOSIS WITHOUT MYELOPATHY OR RADICULOPATHY, LUMBAR REGION: ICD-10-CM

## 2023-03-28 DIAGNOSIS — M25.551 PAIN IN RIGHT HIP: ICD-10-CM

## 2023-03-28 DIAGNOSIS — M43.16 SPONDYLOLISTHESIS, LUMBAR REGION: ICD-10-CM

## 2023-03-28 DIAGNOSIS — M43.17 SPONDYLOLISTHESIS, LUMBOSACRAL REGION: ICD-10-CM

## 2023-03-28 DIAGNOSIS — M54.50 LOW BACK PAIN, UNSPECIFIED: ICD-10-CM

## 2023-03-28 DIAGNOSIS — M47.817 SPONDYLOSIS WITHOUT MYELOPATHY OR RADICULOPATHY, LUMBOSACRAL REGION: ICD-10-CM

## 2023-03-28 DIAGNOSIS — Z96.642 PRESENCE OF LEFT ARTIFICIAL HIP JOINT: ICD-10-CM

## 2023-03-28 DIAGNOSIS — M43.8X6 OTHER SPECIFIED DEFORMING DORSOPATHIES, LUMBAR REGION: ICD-10-CM

## 2023-09-07 ENCOUNTER — EMERGENCY (EMERGENCY)
Facility: HOSPITAL | Age: 83
LOS: 1 days | Discharge: HOME | End: 2023-09-07
Attending: EMERGENCY MEDICINE | Admitting: EMERGENCY MEDICINE
Payer: MEDICARE

## 2023-09-07 VITALS
DIASTOLIC BLOOD PRESSURE: 77 MMHG | OXYGEN SATURATION: 96 % | RESPIRATION RATE: 18 BRPM | SYSTOLIC BLOOD PRESSURE: 141 MMHG | HEART RATE: 73 BPM | WEIGHT: 125.66 LBS | TEMPERATURE: 98 F

## 2023-09-07 VITALS
HEART RATE: 87 BPM | OXYGEN SATURATION: 97 % | DIASTOLIC BLOOD PRESSURE: 74 MMHG | RESPIRATION RATE: 18 BRPM | TEMPERATURE: 98 F | SYSTOLIC BLOOD PRESSURE: 158 MMHG

## 2023-09-07 DIAGNOSIS — Z86.73 PERSONAL HISTORY OF TRANSIENT ISCHEMIC ATTACK (TIA), AND CEREBRAL INFARCTION WITHOUT RESIDUAL DEFICITS: ICD-10-CM

## 2023-09-07 DIAGNOSIS — Z96.649 PRESENCE OF UNSPECIFIED ARTIFICIAL HIP JOINT: ICD-10-CM

## 2023-09-07 DIAGNOSIS — G30.0 ALZHEIMER'S DISEASE WITH EARLY ONSET: ICD-10-CM

## 2023-09-07 DIAGNOSIS — R42 DIZZINESS AND GIDDINESS: ICD-10-CM

## 2023-09-07 DIAGNOSIS — R26.89 OTHER ABNORMALITIES OF GAIT AND MOBILITY: ICD-10-CM

## 2023-09-07 DIAGNOSIS — I10 ESSENTIAL (PRIMARY) HYPERTENSION: ICD-10-CM

## 2023-09-07 DIAGNOSIS — Z96.649 PRESENCE OF UNSPECIFIED ARTIFICIAL HIP JOINT: Chronic | ICD-10-CM

## 2023-09-07 DIAGNOSIS — Z88.0 ALLERGY STATUS TO PENICILLIN: ICD-10-CM

## 2023-09-07 DIAGNOSIS — F32.A DEPRESSION, UNSPECIFIED: ICD-10-CM

## 2023-09-07 DIAGNOSIS — F02.80 DEMENTIA IN OTHER DISEASES CLASSIFIED ELSEWHERE, UNSPECIFIED SEVERITY, WITHOUT BEHAVIORAL DISTURBANCE, PSYCHOTIC DISTURBANCE, MOOD DISTURBANCE, AND ANXIETY: ICD-10-CM

## 2023-09-07 DIAGNOSIS — E78.5 HYPERLIPIDEMIA, UNSPECIFIED: ICD-10-CM

## 2023-09-07 DIAGNOSIS — I44.7 LEFT BUNDLE-BRANCH BLOCK, UNSPECIFIED: ICD-10-CM

## 2023-09-07 DIAGNOSIS — Z88.8 ALLERGY STATUS TO OTHER DRUGS, MEDICAMENTS AND BIOLOGICAL SUBSTANCES STATUS: ICD-10-CM

## 2023-09-07 LAB
ALBUMIN SERPL ELPH-MCNC: 4.2 G/DL — SIGNIFICANT CHANGE UP (ref 3.3–5)
ALP SERPL-CCNC: 73 U/L — SIGNIFICANT CHANGE UP (ref 40–120)
ALT FLD-CCNC: 15 U/L — SIGNIFICANT CHANGE UP (ref 10–45)
ANION GAP SERPL CALC-SCNC: 10 MMOL/L — SIGNIFICANT CHANGE UP (ref 5–17)
APPEARANCE UR: CLEAR — SIGNIFICANT CHANGE UP
APTT BLD: 28.7 SEC — SIGNIFICANT CHANGE UP (ref 24.5–35.6)
AST SERPL-CCNC: 23 U/L — SIGNIFICANT CHANGE UP (ref 10–40)
BACTERIA # UR AUTO: PRESENT /HPF
BASOPHILS # BLD AUTO: 0.04 K/UL — SIGNIFICANT CHANGE UP (ref 0–0.2)
BASOPHILS NFR BLD AUTO: 0.5 % — SIGNIFICANT CHANGE UP (ref 0–2)
BILIRUB SERPL-MCNC: 0.7 MG/DL — SIGNIFICANT CHANGE UP (ref 0.2–1.2)
BILIRUB UR-MCNC: NEGATIVE — SIGNIFICANT CHANGE UP
BUN SERPL-MCNC: 22 MG/DL — SIGNIFICANT CHANGE UP (ref 7–23)
CALCIUM SERPL-MCNC: 9.9 MG/DL — SIGNIFICANT CHANGE UP (ref 8.4–10.5)
CHLORIDE SERPL-SCNC: 107 MMOL/L — SIGNIFICANT CHANGE UP (ref 96–108)
CO2 SERPL-SCNC: 25 MMOL/L — SIGNIFICANT CHANGE UP (ref 22–31)
COLOR SPEC: YELLOW — SIGNIFICANT CHANGE UP
CREAT SERPL-MCNC: 0.95 MG/DL — SIGNIFICANT CHANGE UP (ref 0.5–1.3)
DIFF PNL FLD: NEGATIVE — SIGNIFICANT CHANGE UP
EGFR: 61 ML/MIN/1.73M2 — SIGNIFICANT CHANGE UP
EOSINOPHIL # BLD AUTO: 0.07 K/UL — SIGNIFICANT CHANGE UP (ref 0–0.5)
EOSINOPHIL NFR BLD AUTO: 0.8 % — SIGNIFICANT CHANGE UP (ref 0–6)
EPI CELLS # UR: ABNORMAL /HPF (ref 0–5)
GLUCOSE SERPL-MCNC: 115 MG/DL — HIGH (ref 70–99)
GLUCOSE UR QL: NEGATIVE — SIGNIFICANT CHANGE UP
HCT VFR BLD CALC: 37.8 % — SIGNIFICANT CHANGE UP (ref 34.5–45)
HGB BLD-MCNC: 12.4 G/DL — SIGNIFICANT CHANGE UP (ref 11.5–15.5)
IMM GRANULOCYTES NFR BLD AUTO: 0.4 % — SIGNIFICANT CHANGE UP (ref 0–0.9)
INR BLD: 0.92 — SIGNIFICANT CHANGE UP (ref 0.85–1.18)
KETONES UR-MCNC: NEGATIVE — SIGNIFICANT CHANGE UP
LEUKOCYTE ESTERASE UR-ACNC: ABNORMAL
LYMPHOCYTES # BLD AUTO: 1.85 K/UL — SIGNIFICANT CHANGE UP (ref 1–3.3)
LYMPHOCYTES # BLD AUTO: 22.4 % — SIGNIFICANT CHANGE UP (ref 13–44)
MCHC RBC-ENTMCNC: 31.2 PG — SIGNIFICANT CHANGE UP (ref 27–34)
MCHC RBC-ENTMCNC: 32.8 GM/DL — SIGNIFICANT CHANGE UP (ref 32–36)
MCV RBC AUTO: 95.2 FL — SIGNIFICANT CHANGE UP (ref 80–100)
MONOCYTES # BLD AUTO: 0.6 K/UL — SIGNIFICANT CHANGE UP (ref 0–0.9)
MONOCYTES NFR BLD AUTO: 7.3 % — SIGNIFICANT CHANGE UP (ref 2–14)
NEUTROPHILS # BLD AUTO: 5.67 K/UL — SIGNIFICANT CHANGE UP (ref 1.8–7.4)
NEUTROPHILS NFR BLD AUTO: 68.6 % — SIGNIFICANT CHANGE UP (ref 43–77)
NITRITE UR-MCNC: NEGATIVE — SIGNIFICANT CHANGE UP
NRBC # BLD: 0 /100 WBCS — SIGNIFICANT CHANGE UP (ref 0–0)
PH UR: 7 — SIGNIFICANT CHANGE UP (ref 5–8)
PLATELET # BLD AUTO: 217 K/UL — SIGNIFICANT CHANGE UP (ref 150–400)
POTASSIUM SERPL-MCNC: 4.2 MMOL/L — SIGNIFICANT CHANGE UP (ref 3.5–5.3)
POTASSIUM SERPL-SCNC: 4.2 MMOL/L — SIGNIFICANT CHANGE UP (ref 3.5–5.3)
PROT SERPL-MCNC: 7.3 G/DL — SIGNIFICANT CHANGE UP (ref 6–8.3)
PROT UR-MCNC: NEGATIVE MG/DL — SIGNIFICANT CHANGE UP
PROTHROM AB SERPL-ACNC: 10.5 SEC — SIGNIFICANT CHANGE UP (ref 9.5–13)
RBC # BLD: 3.97 M/UL — SIGNIFICANT CHANGE UP (ref 3.8–5.2)
RBC # FLD: 14.1 % — SIGNIFICANT CHANGE UP (ref 10.3–14.5)
RBC CASTS # UR COMP ASSIST: < 5 /HPF — SIGNIFICANT CHANGE UP
SODIUM SERPL-SCNC: 142 MMOL/L — SIGNIFICANT CHANGE UP (ref 135–145)
SP GR SPEC: <=1.005 — SIGNIFICANT CHANGE UP (ref 1–1.03)
TROPONIN T, HIGH SENSITIVITY RESULT: 11 NG/L — SIGNIFICANT CHANGE UP (ref 0–51)
UROBILINOGEN FLD QL: 0.2 E.U./DL — SIGNIFICANT CHANGE UP
WBC # BLD: 8.26 K/UL — SIGNIFICANT CHANGE UP (ref 3.8–10.5)
WBC # FLD AUTO: 8.26 K/UL — SIGNIFICANT CHANGE UP (ref 3.8–10.5)
WBC UR QL: < 5 /HPF — SIGNIFICANT CHANGE UP

## 2023-09-07 PROCEDURE — 0042T: CPT | Mod: MA

## 2023-09-07 PROCEDURE — 70450 CT HEAD/BRAIN W/O DYE: CPT | Mod: 26,MA,XU

## 2023-09-07 PROCEDURE — 85025 COMPLETE CBC W/AUTO DIFF WBC: CPT

## 2023-09-07 PROCEDURE — 81001 URINALYSIS AUTO W/SCOPE: CPT

## 2023-09-07 PROCEDURE — 36415 COLL VENOUS BLD VENIPUNCTURE: CPT

## 2023-09-07 PROCEDURE — 70498 CT ANGIOGRAPHY NECK: CPT | Mod: 26,MA

## 2023-09-07 PROCEDURE — 70496 CT ANGIOGRAPHY HEAD: CPT | Mod: MA

## 2023-09-07 PROCEDURE — 99285 EMERGENCY DEPT VISIT HI MDM: CPT | Mod: 25

## 2023-09-07 PROCEDURE — 85610 PROTHROMBIN TIME: CPT

## 2023-09-07 PROCEDURE — 93010 ELECTROCARDIOGRAM REPORT: CPT

## 2023-09-07 PROCEDURE — 99285 EMERGENCY DEPT VISIT HI MDM: CPT

## 2023-09-07 PROCEDURE — 80053 COMPREHEN METABOLIC PANEL: CPT

## 2023-09-07 PROCEDURE — 70496 CT ANGIOGRAPHY HEAD: CPT | Mod: 26,MA

## 2023-09-07 PROCEDURE — 93005 ELECTROCARDIOGRAM TRACING: CPT

## 2023-09-07 PROCEDURE — 85730 THROMBOPLASTIN TIME PARTIAL: CPT

## 2023-09-07 PROCEDURE — 84484 ASSAY OF TROPONIN QUANT: CPT

## 2023-09-07 PROCEDURE — 70450 CT HEAD/BRAIN W/O DYE: CPT | Mod: MA

## 2023-09-07 PROCEDURE — 82962 GLUCOSE BLOOD TEST: CPT

## 2023-09-07 PROCEDURE — 70498 CT ANGIOGRAPHY NECK: CPT | Mod: MA

## 2023-09-07 RX ORDER — MECLIZINE HCL 12.5 MG
1 TABLET ORAL
Qty: 15 | Refills: 0
Start: 2023-09-07 | End: 2023-09-11

## 2023-09-07 RX ORDER — MECLIZINE HCL 12.5 MG
12.5 TABLET ORAL ONCE
Refills: 0 | Status: COMPLETED | OUTPATIENT
Start: 2023-09-07 | End: 2023-09-07

## 2023-09-07 RX ADMIN — Medication 12.5 MILLIGRAM(S): at 13:41

## 2023-09-07 NOTE — ED ADULT NURSE NOTE - OBJECTIVE STATEMENT
Patient received at this time aox4 BIBA for unsteady gait upon waking today 0600. LKN 1930. Stroke code called. IV access established. Pt brought to CT on monitor with primary RN, MD.

## 2023-09-07 NOTE — ED PROVIDER NOTE - PATIENT PORTAL LINK FT
You can access the FollowMyHealth Patient Portal offered by [facility] by registering at the following website: [email or web address]. By joining [employer]’s FollowMyHealth portal, you will also be able to view your health information using other applications (apps) compatible with our system.

## 2023-09-07 NOTE — ED PROVIDER NOTE - OBJECTIVE STATEMENT
Patient is an 82-year-old female, history of hypertension, hyperlipidemia, cardiomyopathy, depression, early Alzheimer's dementia, mini strokes, who presents with complaints of feeling dizzy upon awakening this morning at 6 AM today. Last known normal was 7:30 last night when patient went to bed. Dizziness is described as sensation of imbalance, feeling as if she needs to hold onto the walls when walking, denies any falls.  No sensation of room spinning.  No acute vision changes, speech changes, numbness, tingling or weakness of her body.  No chest pain, shortness of breath or palpitations.  Patient denies any fevers, chills, recent URI symptoms, tinnitus, nausea or vomiting.

## 2023-09-07 NOTE — CONSULT NOTE ADULT - ASSESSMENT
82y Female with PMHx of HTN, memory decline Alzheimer's), cardiomyopathy, IBS, HLD, depression p/w unsteady gait upon waking up today at 6AM, LKW 7pm last night. NIHSS 0, no cerebellar signs. 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  Patient reassessed in early afternoon with no new symptoms, dizziness triggered with changes in position and head movement.     Low suspicion of infarct as symptoms can be provoked, more consistent with peripheral etiology  - can trial meclizine for symptom management.   - no further stroke w/u needed. Patient can continue with her home daily aspirin 81mg daily  - can f/u with PCP, outpatient vestibular therapy    Stroke to sign off.   Case discussed with Dr. Garsia

## 2023-09-07 NOTE — ED ADULT NURSE NOTE - NSFALLRISKINTERV_ED_ALL_ED

## 2023-09-07 NOTE — ED ADULT NURSE NOTE - NSICDXPASTMEDICALHX_GEN_ALL_CORE_FT
PAST MEDICAL HISTORY:  Cardiomyopathy     Depression     HTN (hypertension)     IBS (irritable bowel syndrome)

## 2023-09-07 NOTE — ED PROVIDER NOTE - CLINICAL SUMMARY MEDICAL DECISION MAKING FREE TEXT BOX
Impression: 82-year-old female, history of hypertension, hyperlipidemia, cardiomyopathy, depression, early Alzheimer's dementia, mini strokes, who presents with complaints of feeling dizzy upon awakening this morning at 6 AM today. Last known normal was 7:30 last night when patient went to bed. Stroke code activated at triage. Pt evaluated by stroke PA; NIHSS 0. CT head neg for acute bleed/ infarct. CTA H/N, CTP results pending. No cerebellar signs on neuro exam. EKG showing nsr w/ LBBB, no prior to compare to. No cp, sob. Will await test results and consult recommendations. Will continue to monitor. Impression: 82-year-old female, history of hypertension, hyperlipidemia, cardiomyopathy, depression, early Alzheimer's dementia, mini strokes, who presents with complaints of feeling dizzy upon awakening this morning at 6 AM today. Last known normal was 7:30 last night when patient went to bed. Stroke code activated at triage. Pt evaluated by stroke PA; NIHSS 0. CT head neg for acute bleed/ infarct. CTA H/N, CTP results pending. No cerebellar signs on neuro exam. EKG showing nsr w/ LBBB, no prior to compare to. No cp, sob. Will await test results and consult recommendations. Will continue to monitor.    No sig lab abnormalities noted. Imaging results noted. Low suspicion for acute ischemic event as per stroke. Sx's likely related peripheral vertigo. Pt given meclizine w/ improvement of sx's. ED evaluation and management discussed with the patient and partner in detail.  Close PMD/ neuro follow up encouraged for re-eval and to be arranged for vestibulo-therapy  Strict ED return instructions discussed in detail and patient given the opportunity to ask any questions about their discharge diagnosis and instructions. Patient verbalized understanding.

## 2023-09-07 NOTE — ED PROVIDER NOTE - NSFOLLOWUPINSTRUCTIONS_ED_ALL_ED_FT
Take meclizine every 8 hours as needed for dizziness.  Follow up with your primary care physician to be arranged for vestibulotherapy for your dizziness and vertigo.  Return to er for any new or worsening symptoms.     Vertigo  Vertigo is the feeling that you or the things around you are moving when they are not. This feeling can come and go at any time. Vertigo often goes away on its own. This condition can be dangerous if it happens when you are doing activities like driving or working with machines.    Your doctor will do tests to find the cause of your vertigo. These tests will also help your doctor decide on the best treatment for you.    Follow these instructions at home:  Eating and drinking    A comparison of three sample cups showing dark yellow, yellow, and pale yellow urine.  A sign showing that a person should not drink beer, wine, or liquor.  Drink enough fluid to keep your pee (urine) pale yellow.  Do not drink alcohol.  Activity    Return to your normal activities when your doctor says that it is safe.  In the morning, first sit up on the side of the bed. When you feel okay, stand slowly while you hold onto something until you know that your balance is fine.  Move slowly. Avoid sudden body or head movements or certain positions, as told by your doctor.  Use a cane if you have trouble standing or walking.  Sit down right away if you feel dizzy.  Avoid doing any tasks or activities that can cause danger to you or others if you get dizzy.  Avoid bending down if you feel dizzy. Place items in your home so that they are easy for you to reach without bending or leaning over.  Do not drive or use machinery if you feel dizzy.  General instructions    Take over-the-counter and prescription medicines only as told by your doctor.  Keep all follow-up visits.  Contact a doctor if:  Your medicine does not help your vertigo.  Your problems get worse or you have new symptoms.  You have a fever.  You feel like you may vomit (nauseous), or this feeling gets worse.  You start to vomit.  Your family or friends see changes in how you act.  You lose feeling (have numbness) in part of your body.  You feel prickling and tingling in a part of your body.  Get help right away if:  You are always dizzy.  You faint.  You get very bad headaches.  You get a stiff neck.  Bright light starts to bother you.  You have trouble moving or talking.  You feel weak in your hands, arms, or legs.  You have changes in your hearing or in how you see (vision).  These symptoms may be an emergency. Get help right away. Call your local emergency services (911 in the [place]  Do not wait to see if the symptoms will go away.  Do not drive yourself to the hospital.  Summary  Vertigo is the feeling that you or the things around you are moving when they are not.  Your doctor will do tests to find the cause of your vertigo.  You may be told to avoid some tasks, positions, or movements.  Contact a doctor if your medicine is not helping, or if you have a fever, new symptoms, or a change in how you act.  Get help right away if you get very bad headaches, or if you have changes in how you speak, hear, or see.  This information is not intended to replace advice given to you by your health care provider. Make sure you discuss any questions you have with your health care provider.

## 2023-09-07 NOTE — CONSULT NOTE ADULT - SUBJECTIVE AND OBJECTIVE BOX
**STROKE CODE CONSULT NOTE**    Last known well time/Time of onset of symptoms: 7pm 9/6/23    HPI: 82y Female with PMHx of HTN, memory decline Alzheimer's), cardiomyopathy, IBS, HLD, depression p/w unsteady gait upon waking up today at 6AM. Patient went to be previous night around 7pm asymptomatic. This morning after getting out of bed, she had trouble ambulating to the bathroom, required to hold the walls to prevent herself from falling. She denies dizziness, room spinning, only saying that her balance was altered and she couldn't walk normally.     ED:  141/77. NIHSS 0. Gait unsteady after a few steps. Dizziness spells provoked only in changes in position, resolved in seconds after remaining still. CT imaging unremarkable.     T(C): 36.7 (09-07-23 @ 10:26), Max: 36.9 (09-07-23 @ 09:23)  HR: 82 (09-07-23 @ 11:26) (69 - 82)  BP: 154/75 (09-07-23 @ 11:26) (141/77 - 154/75)  RR: 16 (09-07-23 @ 11:26) (16 - 18)  SpO2: 96% (09-07-23 @ 11:26) (96% - 99%)    PAST MEDICAL & SURGICAL HISTORY:  Depression      Cardiomyopathy      IBS (irritable bowel syndrome)      HTN (hypertension)      History of hip replacement          FAMILY HISTORY:  No pertinent family history in first degree relatives        SOCIAL HISTORY:   Smoking status: no  Drinking: no  Drug Use: no    ROS:   Constitutional: No fever, weight loss or fatigue  Eyes: No eye pain, visual disturbances, or discharge  ENMT:  No difficulty hearing, tinnitus; No sinus or throat pain  Neck: No pain or stiffness  Respiratory: No cough, wheezing, chills or hemoptysis  Cardiovascular: No chest pain, palpitations, shortness of breath, or leg swelling  Gastrointestinal: No abdominal pain. No nausea, vomiting or hematemesis; No diarrhea or constipation. Nohematochezia.  Genitourinary: No dysuria, frequency, hematuria or incontinence  Neurological: As per HPI    MEDICATIONS  (STANDING):    MEDICATIONS  (PRN):    Allergies    penicillin (Hives)  epinephrine (Unknown)    Intolerances      Vital Signs Last 24 Hrs  T(C): 36.7 (07 Sep 2023 10:26), Max: 36.9 (07 Sep 2023 09:23)  T(F): 98 (07 Sep 2023 10:26), Max: 98.4 (07 Sep 2023 09:23)  HR: 82 (07 Sep 2023 11:26) (69 - 82)  BP: 154/75 (07 Sep 2023 11:26) (141/77 - 154/75)  BP(mean): 69 (07 Sep 2023 10:26) (69 - 69)  RR: 16 (07 Sep 2023 11:26) (16 - 18)  SpO2: 96% (07 Sep 2023 11:26) (96% - 99%)    Parameters below as of 07 Sep 2023 11:26  Patient On (Oxygen Delivery Method): room air        Physical exam:  Constitutional: No acute distress, conversant  Eyes: Anicteric sclerae, moist conjunctivae, see below for CNs  Neck: trachea midline, FROM, supple, no thyromegaly or lymphadenopathy  Pulmonary: No use of accessory muscles  GI: Abdomen soft, non-distended, non-tender  Extremities: no edema    Neurologic:  -Mental status: Awake, alert, oriented to person, place, and time. Speech is fluent with intact naming, repetition, and comprehension, no dysarthria. Recent and remote memory intact. Follows commands. Attention/concentration intact. Fund of knowledge appropriate.  -Cranial nerves:   II: Visual fields are full to confrontation.  III, IV, VI: Extraocular movements are intact without nystagmus. Pupils equally round and reactive to light  V:  Facial sensation V1-V3 equal and intact   VII: Face is symmetric with normal eye closure and smile  VIII: Hearing is bilaterally intact   XII: Tongue protrudes midline  Motor: Normal bulk and tone. No pronator drift. Strength bilateral upper extremity 5/5, bilateral lower extremities 5/5.  Sensation: Intact to light touch bilaterally. No neglect or extinction on double simultaneous testing.  Coordination: No dysmetria on finger-to-nose and heel-to-shin bilaterally  Gait: Unsteady gait    NIHSS: 0    Fingerstick Blood Glucose: CAPILLARY BLOOD GLUCOSE  112 (07 Sep 2023 12:19)      POCT Blood Glucose.: 112 mg/dL (07 Sep 2023 09:21)    LABS:                        12.4   8.26  )-----------( 217      ( 07 Sep 2023 09:26 )             37.8     09-07    142  |  107  |  22  ----------------------------<  115<H>  4.2   |  25  |  0.95    Ca    9.9      07 Sep 2023 09:26    TPro  7.3  /  Alb  4.2  /  TBili  0.7  /  DBili  x   /  AST  23  /  ALT  15  /  AlkPhos  73  09-07    PT/INR - ( 07 Sep 2023 09:26 )   PT: 10.5 sec;   INR: 0.92          PTT - ( 07 Sep 2023 09:26 )  PTT:28.7 sec      Urinalysis Basic - ( 07 Sep 2023 10:07 )    Color: Yellow / Appearance: Clear / SG: <=1.005 / pH: x  Gluc: x / Ketone: NEGATIVE  / Bili: Negative / Urobili: 0.2 E.U./dL   Blood: x / Protein: NEGATIVE mg/dL / Nitrite: NEGATIVE   Leuk Esterase: Trace / RBC: < 5 /HPF / WBC < 5 /HPF   Sq Epi: x / Non Sq Epi: x / Bacteria: Present /HPF        RADIOLOGY & ADDITIONAL STUDIES:  < from: CT Brain Stroke Protocol (09.07.23 @ 09:44) >    IMPRESSION: No acute intracranial hemorrhage, mass effect, or CT evidence   of recent transcortical infarction.    < end of copied text >  < from: CT Angio Brain Stroke Protocol  w/ IV Cont (09.07.23 @ 09:47) >  IMPRESSION:    Intracranial CTA:    No large vessel occlusion or high-grade stenosis.    There is a 2 mm outpouching at the termination of the basilar artery,   which may represent a small aneurysm versus infundibulum.    Extracranial CTA:  Limited evaluation of the proximal V2 segment of the left vertebral   artery due to streak artifact from contrast bolus. Otherwise, no   significant stenosis, occlusion, or dissection of the cervical carotid or   vertebral arteries.      < end of copied text >  < from: CT Brain Perfusion Maps Stroke (09.07.23 @ 09:47) >  IMPRESSION: No rapid calculated perfusion abnormality.    < end of copied text >      -----------------------------------------------------------------------------------------------------------------  IV-tenectaplase (Y/N):   no                         Bolus time:    Tenectaplase Dose Verification w/ RN:  Reason IV-tenectaplase not given: out of window

## 2023-09-07 NOTE — ED PROVIDER NOTE - PHYSICAL EXAMINATION
VITAL SIGNS: I have reviewed nursing notes and confirm.  CONSTITUTIONAL: Well appearing, in no acute distress.   SKIN:  warm and dry, no acute rash.   HEAD:  normocephalic, atraumatic.  EYES: EOM intact; conjunctiva and sclera clear.  ENT: No nasal discharge; airway clear.   NECK: Supple.  CARD: S1, S2, Regular rate and rhythm.   RESP:  Clear to auscultation b/l, no wheezes, rales or rhonchi.  ABD: Normal bowel sounds; soft; non-distended; non-tender; no guarding/ rebound.  EXT: Normal ROM. No peripheral edema. Pulses intact and equal b/l.  NEURO: Alert, oriented x 3, CN grossly unremarkable, no facial droop. Speech clear. Neg pronator drift. Motor/ sensation intact and equal b/l. Neg FTN, HTS, DEBBIE. Very guarded with gait but stead with no ataxia.   PSYCH: Cooperative, mood and affect appropriate. VITAL SIGNS: I have reviewed nursing notes and confirm.  CONSTITUTIONAL: Well appearing, in no acute distress.   SKIN:  warm and dry, no acute rash.   HEAD:  normocephalic, atraumatic.  EYES: EOM intact; conjunctiva and sclera clear.  ENT: No nasal discharge; airway clear.   NECK: Supple.  CARD: S1, S2, Regular rate and rhythm.   RESP:  Clear to auscultation b/l, no wheezes, rales or rhonchi.  ABD: Normal bowel sounds; soft; non-distended; non-tender; no guarding/ rebound.  EXT: Normal ROM. No peripheral edema. Pulses intact and equal b/l.  NEURO: Alert, oriented x 3, CN grossly unremarkable, no facial droop. Speech clear. Neg pronator drift. Motor/ sensation intact and equal b/l. Neg FTN, HTS, DEBBIE. Very guarded with gait but steady with no ataxia.   PSYCH: Cooperative, mood and affect appropriate.

## 2024-03-28 NOTE — ED PROVIDER NOTE - NSCAREINITIATED _GEN_ER
3/28 left a messages regarding referral from [facility] for environmental, mold allergies.   
Fide Diamond(PA)

## 2024-04-29 ENCOUNTER — APPOINTMENT (OUTPATIENT)
Dept: ORTHOPEDIC SURGERY | Facility: CLINIC | Age: 84
End: 2024-04-29
Payer: MEDICARE

## 2024-04-29 VITALS
SYSTOLIC BLOOD PRESSURE: 120 MMHG | HEIGHT: 62 IN | WEIGHT: 135 LBS | DIASTOLIC BLOOD PRESSURE: 86 MMHG | OXYGEN SATURATION: 95 % | HEART RATE: 72 BPM | BODY MASS INDEX: 24.84 KG/M2

## 2024-04-29 DIAGNOSIS — M54.9 DORSALGIA, UNSPECIFIED: ICD-10-CM

## 2024-04-29 PROCEDURE — 99213 OFFICE O/P EST LOW 20 MIN: CPT

## 2024-04-29 NOTE — DISCUSSION/SUMMARY
[de-identified] : Low back pain with no obvious trauma or injury and no obvious neurologic signs.  I would treat this conservatively.  I will refer her for an evaluation of her back and perhaps other modalities or injections could be considered.  I recommended continue with the heating pads but not sleep on them.  I provided prescription for short course of anti-inflammatory medication to add to her Tylenol.  I recommended use of a cane to assist with ambulation and help with balance and she said she would consider.  I provided an outpatient physical therapy prescription to work on her gait and balance as well.  She can follow-up after the spine evaluation or if anything changes significantly and she should follow-up as planned for her left hip arthroplasty.  Patient is in agreement this plan.  All questions answered.

## 2024-04-29 NOTE — HISTORY OF PRESENT ILLNESS
[de-identified] : Patient presents today for evaluation of back pain.  She has had an acute onset of low back pain with no obvious trauma fall or injury.  No numbness or tingling.  No bowel bladder problems.  She does report she is received a diagnosis of dementia and now has significant aide support at home  She denies using any assistive devices.  She is only used acetaminophen and heating pad.  Heating pad does make the back feel better.  Symptoms are worse with activity and improves when she lays on her back she reports.

## 2024-04-29 NOTE — PHYSICAL EXAM
[de-identified] : Physical exam she is alert and oriented today and appropriately responsive.  She is able ambulate with a short stride gait with no antalgia or Trendelenburg.  She is neurologically intact.  She does endorse low back pain when she gets up and walks.   [de-identified] : Patient declined imaging of the hip and back today.

## 2024-05-03 ENCOUNTER — APPOINTMENT (OUTPATIENT)
Dept: PHYSICAL MEDICINE AND REHAB | Facility: CLINIC | Age: 84
End: 2024-05-03

## 2024-05-23 RX ORDER — MELOXICAM 7.5 MG/1
7.5 TABLET ORAL
Qty: 30 | Refills: 0 | Status: ACTIVE | COMMUNITY
Start: 2024-04-29 | End: 1900-01-01

## 2024-12-10 ENCOUNTER — OUTPATIENT (OUTPATIENT)
Dept: OUTPATIENT SERVICES | Facility: HOSPITAL | Age: 84
LOS: 1 days | End: 2024-12-10
Payer: MEDICARE

## 2024-12-10 ENCOUNTER — APPOINTMENT (OUTPATIENT)
Dept: NUCLEAR MEDICINE | Facility: HOSPITAL | Age: 84
End: 2024-12-10

## 2024-12-10 DIAGNOSIS — Z96.649 PRESENCE OF UNSPECIFIED ARTIFICIAL HIP JOINT: Chronic | ICD-10-CM

## 2024-12-10 PROCEDURE — A9552: CPT

## 2024-12-10 PROCEDURE — 78608 BRAIN IMAGING (PET): CPT | Mod: 26,MH

## 2024-12-10 PROCEDURE — 82962 GLUCOSE BLOOD TEST: CPT

## 2024-12-10 PROCEDURE — 78608 BRAIN IMAGING (PET): CPT

## 2025-05-31 NOTE — ED ADULT NURSE NOTE - CHIEF COMPLAINT QUOTE
PT transferred to T91 at 1300 via wheelchair. All belongings sent with patient, dual skin check sompleted w receiving rn.   post mechanical fall with laceration to left shin